# Patient Record
Sex: FEMALE | Race: WHITE | NOT HISPANIC OR LATINO | Employment: STUDENT | URBAN - METROPOLITAN AREA
[De-identification: names, ages, dates, MRNs, and addresses within clinical notes are randomized per-mention and may not be internally consistent; named-entity substitution may affect disease eponyms.]

---
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
## 2023-02-23 ENCOUNTER — OFFICE VISIT (OUTPATIENT)
Dept: URGENT CARE | Facility: CLINIC | Age: 18
End: 2023-02-23
Payer: COMMERCIAL

## 2023-02-23 VITALS
BODY MASS INDEX: 25.83 KG/M2 | TEMPERATURE: 98 F | OXYGEN SATURATION: 98 % | HEART RATE: 84 BPM | SYSTOLIC BLOOD PRESSURE: 118 MMHG | HEIGHT: 65 IN | RESPIRATION RATE: 18 BRPM | DIASTOLIC BLOOD PRESSURE: 65 MMHG | WEIGHT: 155 LBS

## 2023-02-23 DIAGNOSIS — J06.9 URI WITH COUGH AND CONGESTION: Primary | ICD-10-CM

## 2023-02-23 LAB
CTP QC/QA: YES
SARS-COV-2 AG RESP QL IA.RAPID: NEGATIVE

## 2023-02-23 PROCEDURE — 99214 PR OFFICE/OUTPT VISIT, EST, LEVL IV, 30-39 MIN: ICD-10-PCS | Mod: S$GLB,,, | Performed by: PHYSICIAN ASSISTANT

## 2023-02-23 PROCEDURE — 3008F PR BODY MASS INDEX (BMI) DOCUMENTED: ICD-10-PCS | Mod: CPTII,S$GLB,, | Performed by: PHYSICIAN ASSISTANT

## 2023-02-23 PROCEDURE — 3008F BODY MASS INDEX DOCD: CPT | Mod: CPTII,S$GLB,, | Performed by: PHYSICIAN ASSISTANT

## 2023-02-23 PROCEDURE — 3078F DIAST BP <80 MM HG: CPT | Mod: CPTII,S$GLB,, | Performed by: PHYSICIAN ASSISTANT

## 2023-02-23 PROCEDURE — 3074F SYST BP LT 130 MM HG: CPT | Mod: CPTII,S$GLB,, | Performed by: PHYSICIAN ASSISTANT

## 2023-02-23 PROCEDURE — 1159F PR MEDICATION LIST DOCUMENTED IN MEDICAL RECORD: ICD-10-PCS | Mod: CPTII,S$GLB,, | Performed by: PHYSICIAN ASSISTANT

## 2023-02-23 PROCEDURE — 1160F PR REVIEW ALL MEDS BY PRESCRIBER/CLIN PHARMACIST DOCUMENTED: ICD-10-PCS | Mod: CPTII,S$GLB,, | Performed by: PHYSICIAN ASSISTANT

## 2023-02-23 PROCEDURE — 1160F RVW MEDS BY RX/DR IN RCRD: CPT | Mod: CPTII,S$GLB,, | Performed by: PHYSICIAN ASSISTANT

## 2023-02-23 PROCEDURE — 3074F PR MOST RECENT SYSTOLIC BLOOD PRESSURE < 130 MM HG: ICD-10-PCS | Mod: CPTII,S$GLB,, | Performed by: PHYSICIAN ASSISTANT

## 2023-02-23 PROCEDURE — 1159F MED LIST DOCD IN RCRD: CPT | Mod: CPTII,S$GLB,, | Performed by: PHYSICIAN ASSISTANT

## 2023-02-23 PROCEDURE — 87811 SARS CORONAVIRUS 2 ANTIGEN POCT, MANUAL READ: ICD-10-PCS | Mod: QW,S$GLB,, | Performed by: PHYSICIAN ASSISTANT

## 2023-02-23 PROCEDURE — 87811 SARS-COV-2 COVID19 W/OPTIC: CPT | Mod: QW,S$GLB,, | Performed by: PHYSICIAN ASSISTANT

## 2023-02-23 PROCEDURE — 99214 OFFICE O/P EST MOD 30 MIN: CPT | Mod: S$GLB,,, | Performed by: PHYSICIAN ASSISTANT

## 2023-02-23 PROCEDURE — 3078F PR MOST RECENT DIASTOLIC BLOOD PRESSURE < 80 MM HG: ICD-10-PCS | Mod: CPTII,S$GLB,, | Performed by: PHYSICIAN ASSISTANT

## 2023-02-23 RX ORDER — BROMPHENIRAMINE MALEATE, PSEUDOEPHEDRINE HYDROCHLORIDE, AND DEXTROMETHORPHAN HYDROBROMIDE 2; 30; 10 MG/5ML; MG/5ML; MG/5ML
10 SYRUP ORAL EVERY 4 HOURS PRN
Qty: 120 ML | Refills: 0 | Status: SHIPPED | OUTPATIENT
Start: 2023-02-23 | End: 2023-03-05

## 2023-02-23 RX ORDER — FLUTICASONE PROPIONATE 50 MCG
2 SPRAY, SUSPENSION (ML) NASAL DAILY
Qty: 9.9 ML | Refills: 0 | Status: SHIPPED | OUTPATIENT
Start: 2023-02-23 | End: 2023-03-02

## 2023-02-23 NOTE — PROGRESS NOTES
"Subjective:       Patient ID: Aishwarya Fernandez is a 17 y.o. female.    Vitals:  height is 5' 5" (1.651 m) and weight is 70.3 kg (155 lb). Her tympanic temperature is 97.8 °F (36.6 °C). Her blood pressure is 118/65 and her pulse is 84. Her respiration is 18 and oxygen saturation is 98%.     Chief Complaint: Nasal Congestion    Aishwarya Fernandez is a 17 y.o presenting with sxs of nasal congestion which onset 2-3 days ago. Other associated sxs include productive coughing, rhinorrhea, sore throat, and generalized body aches. Patient reports she has been taking a z pack for the sxs she is presenting.     Sinus Problem  This is a new problem. The current episode started in the past 7 days. There has been no fever. Associated symptoms include chills, congestion, coughing, sinus pressure, sneezing and a sore throat. Pertinent negatives include no neck pain or shortness of breath. The treatment provided no relief.     Constitution: Positive for chills and fatigue. Negative for fever.   HENT:  Positive for congestion, sinus pressure and sore throat.    Neck: Negative for neck pain and neck stiffness.   Cardiovascular:  Negative for chest pain, leg swelling, palpitations and sob on exertion.   Eyes:  Negative for eye itching, eye pain and eye redness.   Respiratory:  Positive for cough and sputum production. Negative for shortness of breath.    Gastrointestinal:  Negative for abdominal pain, nausea, vomiting and diarrhea.   Musculoskeletal:  Positive for muscle ache.   Skin:  Negative for rash and wound.   Allergic/Immunologic: Positive for sneezing.     Objective:      Physical Exam   Constitutional: She is oriented to person, place, and time. She appears well-developed. She is cooperative.  Non-toxic appearance. She does not appear ill. No distress.   HENT:   Head: Normocephalic and atraumatic.   Ears:   Right Ear: Hearing, tympanic membrane, external ear and ear canal normal.   Left Ear: Hearing, tympanic membrane, external " ear and ear canal normal.   Nose: Mucosal edema and congestion present. No rhinorrhea or nasal deformity. No epistaxis. Right sinus exhibits no maxillary sinus tenderness and no frontal sinus tenderness. Left sinus exhibits no maxillary sinus tenderness and no frontal sinus tenderness.   Mouth/Throat: Uvula is midline, oropharynx is clear and moist and mucous membranes are normal. No trismus in the jaw. Normal dentition. No uvula swelling. No oropharyngeal exudate, posterior oropharyngeal edema or posterior oropharyngeal erythema.   Eyes: Conjunctivae and lids are normal. No scleral icterus.   Neck: Trachea normal and phonation normal. Neck supple. No edema present. No erythema present. No neck rigidity present.   Cardiovascular: Normal rate, regular rhythm, normal heart sounds and normal pulses.   Pulmonary/Chest: Effort normal and breath sounds normal. No respiratory distress. She has no decreased breath sounds. She has no wheezes. She has no rhonchi.   Abdominal: Normal appearance. There is no abdominal tenderness. There is no guarding.   Musculoskeletal: Normal range of motion.         General: No deformity. Normal range of motion.   Neurological: She is alert and oriented to person, place, and time. She exhibits normal muscle tone. Coordination normal.   Skin: Skin is warm, dry, intact, not diaphoretic and not pale.   Psychiatric: Her speech is normal and behavior is normal. Judgment and thought content normal.   Nursing note and vitals reviewed.      Results for orders placed or performed in visit on 02/23/23   SARS Coronavirus 2 Antigen, POCT Manual Read   Result Value Ref Range    SARS Coronavirus 2 Antigen Negative Negative     Acceptable Yes        Assessment:       1. URI with cough and congestion            Plan:         URI with cough and congestion  -     SARS Coronavirus 2 Antigen, POCT Manual Read  -     brompheniramine-pseudoeph-DM (BROMFED DM) 2-30-10 mg/5 mL Syrp; Take 10 mLs by  mouth every 4 (four) hours as needed (cough, congestion, runny nose).  Dispense: 120 mL; Refill: 0  -     fluticasone propionate (FLONASE) 50 mcg/actuation nasal spray; 2 sprays (100 mcg total) by Each Nostril route once daily. Dispense 1 bottle for 7 days  Dispense: 9.9 mL; Refill: 0    Sherry Jones PA-C  [facility]       Patient Instructions   Take bromfed for congestion, cough and runny nose. (do not combine with OTC medications also containing decongestant, antihistamine or cough syrup). Sterile saline rinses and flonase to decongest. Tylenol or motrin for pain/fever. Rest and drink plenty of fluids.     Follow up with your doctor for any persistent new fever, or persistent sinus pressure/congestion not improving > 10 days.You need to be seen urgently if you develop any chest pain or shortness of breath.

## 2023-02-23 NOTE — LETTER
February 23, 2023      [facility]  [address] [place] [address] [place] [address]  Phone: [phone]  Fax: [phone]       Patient: Aishwarya Fernandez   YOB: 2005  Date of Visit: 02/23/2023    To Whom It May Concern:    Raven Fernandez  was at [facility] on 02/23/2023. The patient may return to work/school on 02/24/23 with no restrictions. If you have any questions or concerns, or if I can be of further assistance, please do not hesitate to contact me.    Sincerely,    Sherry Jones PA-C

## 2025-03-13 ENCOUNTER — HOSPITAL ENCOUNTER (OUTPATIENT)
Dept: RADIOLOGY | Facility: HOSPITAL | Age: 20
Discharge: HOME | End: 2025-03-13
Attending: PHYSICIAN ASSISTANT
Payer: COMMERCIAL

## 2025-03-13 ENCOUNTER — OFFICE VISIT (OUTPATIENT)
Facility: CLINIC | Age: 20
End: 2025-03-13
Payer: COMMERCIAL

## 2025-03-13 VITALS — WEIGHT: 152 LBS | BODY MASS INDEX: 25.33 KG/M2 | HEIGHT: 65 IN

## 2025-03-13 DIAGNOSIS — M25.512 LEFT SHOULDER PAIN, UNSPECIFIED CHRONICITY: Primary | ICD-10-CM

## 2025-03-13 DIAGNOSIS — M25.512 LEFT SHOULDER PAIN, UNSPECIFIED CHRONICITY: ICD-10-CM

## 2025-03-13 DIAGNOSIS — S49.92XA INJURY OF LEFT SHOULDER, INITIAL ENCOUNTER: ICD-10-CM

## 2025-03-13 DIAGNOSIS — S43.422A SPRAIN OF LEFT ROTATOR CUFF CAPSULE, INITIAL ENCOUNTER: ICD-10-CM

## 2025-03-13 PROCEDURE — 72050 X-RAY EXAM NECK SPINE 4/5VWS: CPT | Mod: TC,PN

## 2025-03-13 PROCEDURE — 72050 X-RAY EXAM NECK SPINE 4/5VWS: CPT | Mod: 26,,, | Performed by: RADIOLOGY

## 2025-03-13 PROCEDURE — 1159F MED LIST DOCD IN RCRD: CPT | Mod: CPTII,S$GLB,, | Performed by: PHYSICIAN ASSISTANT

## 2025-03-13 PROCEDURE — 73030 X-RAY EXAM OF SHOULDER: CPT | Mod: TC,PN,LT

## 2025-03-13 PROCEDURE — 99204 OFFICE O/P NEW MOD 45 MIN: CPT | Mod: S$GLB,,, | Performed by: PHYSICIAN ASSISTANT

## 2025-03-13 PROCEDURE — 73030 X-RAY EXAM OF SHOULDER: CPT | Mod: 26,LT,, | Performed by: RADIOLOGY

## 2025-03-13 PROCEDURE — 3008F BODY MASS INDEX DOCD: CPT | Mod: CPTII,S$GLB,, | Performed by: PHYSICIAN ASSISTANT

## 2025-03-13 PROCEDURE — 99999 PR PBB SHADOW E&M-EST. PATIENT-LVL III: CPT | Mod: PBBFAC,,, | Performed by: PHYSICIAN ASSISTANT

## 2025-03-13 RX ORDER — NAPROXEN 500 MG/1
500 TABLET ORAL 2 TIMES DAILY
Qty: 60 TABLET | Refills: 0 | Status: SHIPPED | OUTPATIENT
Start: 2025-03-13

## 2025-03-13 RX ORDER — METHYLPREDNISOLONE 4 MG/1
TABLET ORAL
Qty: 1 TABLET | Refills: 0 | Status: SHIPPED | OUTPATIENT
Start: 2025-03-13

## 2025-03-13 RX ORDER — CYCLOBENZAPRINE HCL 5 MG
5 TABLET ORAL 2 TIMES DAILY PRN
Qty: 15 TABLET | Refills: 0 | Status: SHIPPED | OUTPATIENT
Start: 2025-03-13

## 2025-04-10 NOTE — PROGRESS NOTES
"      Patient ID: Aishwarya Fernandez  YOB: 2005  MRN: [id number]    Chief Complaint: Pain and Injury of the Left Shoulder    Referred By: [facility]    History of Present Illness: Aishwarya Fernandez is a 19 y.o. female [facility] Unavailable with a chief complaint of Pain and Injury of the Left Shoulder    History of Present Illness    CHIEF COMPLAINT:  - Left shoulder pain and associated neck pain following a skiing accident    HPI:  Aishwarya presents with left shoulder pain following a skiing accident two weeks ago. She does not recall the exact mechanism of the fall but noticed pain under her axilla immediately after getting up. Pain radiates from the side and top of her shoulder up to the bottom of her neck. She reports that the pain originates from the side and top of her shoulder, extending to the bottom of her neck. Symptoms have not worsened since the initial injury, but she has difficulty with movement, particularly when putting her book bag on her shoulder.    She has full range of motion in her shoulder, though with significant pain. She can move it fully, but experiences considerable discomfort at times. She also reports neck pain localized to a specific area indicated during exam.    Aishwarya has tried conservative management with ice and occasional Advil, which has provided minimal relief.    She recently developed numbness and tingling in her hand, describing a shooting pain in her middle fingers that extends from her wrist to her fingers. She denies any noticeable swelling, wounds, or bruising in the affected area, and any history of shoulder dislocation.    During the same fall, she sustained a knee injury which initially resulted in significant bruising described as "black." However, her knee currently feels fine with no ongoing issues.    Aishwarya denies any muscle spasms in the neck.    PREVIOUS TREATMENTS:  - Ice application to the affected area after the injury, " provided minimal relief  - Advil (ibuprofen) for pain, not taken regularly, provided minimal benefit    IMAGING:  - X-rays of the shoulder: normal results  - X-rays of the neck: showed some spinal straightening    MEDICATIONS:  - Advil (ibuprofen): as needed for pain, minimal benefit    WORK STATUS:  - Student at [employer], studying [employer].  - Plan to attend [employer] next fall      ROS:  Cardiovascular: -upper extremity edema  Musculoskeletal: +limb pain, -limb swelling, -muscle spasms, +neck pain, +pain with movement, +limited movement  Neurological: +numbness, +shooting pain sensation, +tingling         Past Medical History:   Past Medical History:   Diagnosis Date    ADHD (attention deficit hyperactivity disorder)      Past Surgical History:   Procedure Laterality Date    ADENOIDECTOMY      TOE SURGERY Right     TONSILLECTOMY       No family history on file.  Social History[1]  Medication List with Changes/Refills   New Medications    CYCLOBENZAPRINE (FLEXERIL) 5 MG TABLET    Take 1 tablet (5 mg total) by mouth 2 (two) times daily as needed for Muscle spasms.    METHYLPREDNISOLONE (MEDROL DOSEPACK) 4 MG TABLET    use as directed    NAPROXEN (NAPROSYN) 500 MG TABLET    Take 1 tablet (500 mg total) by mouth 2 (two) times daily.   Current Medications    DEXTROAMPHETAMINE-AMPHETAMINE (ADDERALL) 15 MG TABLET    SMARTSI Tablet(s) By Mouth Every Evening    ETONOGESTREL-ETHINYL ESTRADIOL (NUVARING) 0.12-0.015 MG/24 HR VAGINAL RING    Place vaginally.    FLUTICASONE PROPIONATE (FLONASE) 50 MCG/ACTUATION NASAL SPRAY    2 sprays (100 mcg total) by Each Nostril route once daily.    MOUNJARO 2.5 MG/0.5 ML PNIJ    Inject into the skin every 7 days.    PROMETHAZINE-DEXTROMETHORPHAN (PROMETHAZINE-DM) 6.25-15 MG/5 ML SYRP    Take 5 mLs by mouth every 6 to 8 hours as needed (prn).    VYVANSE 60 MG CAPSULE    Take 60 mg by mouth every morning.     Review of patient's allergies indicates:  No Known Allergies  Review of  Systems   Constitutional: Negative for chills and fever.   HENT:  Negative for sore throat.    Eyes:  Negative for pain.   Cardiovascular:  Negative for chest pain and leg swelling.   Respiratory:  Negative for cough and shortness of breath.    Skin:  Negative for itching and rash.   Musculoskeletal:  Positive for joint pain and joint swelling.   Gastrointestinal:  Negative for abdominal pain, nausea and vomiting.   Genitourinary:  Negative for dysuria.   Neurological:  Negative for dizziness, numbness and paresthesias.       Physical Exam:   Body mass index is 25.29 kg/m².  There were no vitals filed for this visit.   GENERAL: Well appearing, appropriate for stated age, no acute distress.  CARDIOVASCULAR: Pulses regular by peripheral palpation.  PULMONARY: Respirations are even and non-labored.  NEURO: Awake, alert, and oriented x 3.  PSYCH: Mood & affect are appropriate.  HEENT: Head is normocephalic and atraumatic.  General    Nursing note and vitals reviewed.        Right Shoulder Exam   Right shoulder exam is normal.    Range of Motion   Active abduction:  140   Forward Elevation: 160    Tests & Signs   Anterior Drawer Test: 2+   Posterior Drawer Test: 2+    Other   Sensation: normal    Left Shoulder Exam   Left shoulder exam is normal.    Range of Motion   Active abduction:  140   Forward Elevation: 160    Tests & Signs   Cross arm: negative  Guerrero test: negative  Impingement: negative  Rotator Cuff Painful Arc/Range: mild  Active Compression test ([place]'s Sign): negative  Speed's Test: negative  Anterior Drawer Test: 2+  Posterior Drawer Test: 2+    Other   Sensation: normal       Muscle Strength   Right Upper Extremity   Shoulder Abduction: 5/5   Shoulder Internal Rotation: 5/5   Shoulder External Rotation: 5/5   Supraspinatus: 5/5   Subscapularis: 5/5   Biceps: 5/5   Left Upper Extremity  Shoulder Abduction: 5/5   Shoulder Internal Rotation: 5/5   Shoulder External Rotation: 5/5   Supraspinatus: 5/5    Subscapularis: 5/5   Biceps: 5/5     Vascular Exam     Right Pulses      Radial:                    2+      Left Pulses      Radial:                    2+      Capillary Refill  Right Hand: normal capillary refill        Physical Exam    Musculoskeletal: Good wrist range of motion. Good shoulder range of motion. Good  strength. Good shoulder strength.  MSK: Shoulder - Left: Pain with abduction of left shoulder.         All compartments soft and compressible. Intact extensor pollicis longus, flexor pollicis longus, finger flexion, finger extension, finger abduction and adduction. Sensation intact to radial, median, ulnar, and axillary nerve distributions. Hand warm and well perfused with capillary refill of less than 2 seconds, and palpable distal radial pulses.       Imaging:    X-Ray Cervical Spine Complete 5 view  Narrative: EXAMINATION:  XR CERVICAL SPINE COMPLETE 5 VIEW    CLINICAL HISTORY:  Pain in left shoulder    TECHNIQUE:  AP, lateral, spot and bilateral oblique views of the cervical spine were performed.    COMPARISON:  None    FINDINGS:  Vertebral body heights and alignment are maintained.  No fracture or subluxation.  Disc spaces maintained.  No foraminal narrowing.  Soft tissues unremarkable.  Impression: Unremarkable cervical spine    Electronically signed by: Milind Estrada MD  Date:    03/13/2025  Time:    16:32  X-Ray Shoulder 2 or More Views Left  Narrative: EXAMINATION:  XR SHOULDER COMPLETE 2 OR MORE VIEWS LEFT    CLINICAL HISTORY:  Pain in left shoulder    TECHNIQUE:  Two or three views of the left shoulder were performed.    COMPARISON:  None    FINDINGS:  No acute osseous or soft tissue abnormality.  Impression: As above    Electronically signed by: Milind Estrada MD  Date:    03/13/2025  Time:    16:18        Relevant imaging results reviewed and interpreted by me, discussed with the patient and / or family today.     Other Tests:       Provider Note/Medical Decision Making:        I discussed worrisome and red flag signs and symptoms with the patient. The patient expressed understanding and agreed to alert me immediately or to go to the emergency room if they experience any of these.   Treatment plan was developed with input from the patient/family, and they expressed understanding and agreement with the plan. All questions were answered today.      Shannon Song PA-C  Sports Medicine Physician Assistant     Disclaimer: This note was prepared using a voice recognition system and is likely to have sound alike errors within the text.     This note was generated with the assistance of ambient listening technology. Verbal consent was obtained by the patient and accompanying visitor(s) for the recording of patient appointment to facilitate this note. I attest to having reviewed and edited the generated note for accuracy, though some syntax or spelling errors may persist. Please contact the author of this note for any clarification.          [1]   Social History  Socioeconomic History    Marital status: Single   Tobacco Use    Smoking status: Some Days     Types: Vaping with nicotine    Smokeless tobacco: Never   Substance and Sexual Activity    Alcohol use: Yes     Comment: socially    Drug use: Never    Sexual activity: Yes     Partners: Male   Social History Narrative    ** Merged History Encounter **

## 2025-07-21 ENCOUNTER — PATIENT MESSAGE (OUTPATIENT)
Dept: RESEARCH | Facility: HOSPITAL | Age: 20
End: 2025-07-21
Payer: COMMERCIAL